# Patient Record
Sex: FEMALE | URBAN - METROPOLITAN AREA
[De-identification: names, ages, dates, MRNs, and addresses within clinical notes are randomized per-mention and may not be internally consistent; named-entity substitution may affect disease eponyms.]

---

## 2022-12-29 ENCOUNTER — PROBLEM (OUTPATIENT)
Dept: URBAN - METROPOLITAN AREA CLINIC 6 | Facility: CLINIC | Age: 57
End: 2022-12-29

## 2022-12-29 DIAGNOSIS — H43.812: ICD-10-CM

## 2022-12-29 DIAGNOSIS — H25.13: ICD-10-CM

## 2022-12-29 PROCEDURE — 92014 COMPRE OPH EXAM EST PT 1/>: CPT

## 2022-12-29 ASSESSMENT — KERATOMETRY
OD_AXISANGLE2_DEGREES: 118
OD_K2POWER_DIOPTERS: 43.50
OS_K1POWER_DIOPTERS: 42.75
OD_K1POWER_DIOPTERS: 43.00
OS_K2POWER_DIOPTERS: 43.50
OD_AXISANGLE_DEGREES: 28
OS_AXISANGLE2_DEGREES: 62
OS_AXISANGLE_DEGREES: 152

## 2022-12-29 ASSESSMENT — VISUAL ACUITY
OD_CC: 20/30-1
OS_CC: 20/30+2

## 2022-12-29 ASSESSMENT — TONOMETRY
OS_IOP_MMHG: 11
OD_IOP_MMHG: 10

## 2023-01-27 ENCOUNTER — FOLLOW UP (OUTPATIENT)
Dept: URBAN - METROPOLITAN AREA CLINIC 6 | Facility: CLINIC | Age: 58
End: 2023-01-27

## 2023-01-27 DIAGNOSIS — H25.13: ICD-10-CM

## 2023-01-27 DIAGNOSIS — H43.812: ICD-10-CM

## 2023-01-27 PROCEDURE — 92012 INTRM OPH EXAM EST PATIENT: CPT

## 2023-01-27 ASSESSMENT — KERATOMETRY
OS_K2POWER_DIOPTERS: 43.50
OD_K2POWER_DIOPTERS: 43.50
OD_K1POWER_DIOPTERS: 43.00
OS_AXISANGLE2_DEGREES: 62
OS_K1POWER_DIOPTERS: 42.75
OD_AXISANGLE2_DEGREES: 118
OD_AXISANGLE_DEGREES: 28
OS_AXISANGLE_DEGREES: 152

## 2023-01-27 ASSESSMENT — VISUAL ACUITY
OD_SC: 20/150
OS_SC: 20/200
OS_PH: 20/50
OD_PH: 20/40-3

## 2023-01-27 ASSESSMENT — TONOMETRY
OD_IOP_MMHG: 11
OS_IOP_MMHG: 9

## 2024-10-25 ENCOUNTER — OFFICE VISIT (OUTPATIENT)
Dept: URGENT CARE | Facility: CLINIC | Age: 59
End: 2024-10-25
Payer: COMMERCIAL

## 2024-10-25 ENCOUNTER — APPOINTMENT (OUTPATIENT)
Dept: RADIOLOGY | Facility: CLINIC | Age: 59
End: 2024-10-25
Payer: COMMERCIAL

## 2024-10-25 VITALS
OXYGEN SATURATION: 99 % | DIASTOLIC BLOOD PRESSURE: 61 MMHG | HEIGHT: 63 IN | SYSTOLIC BLOOD PRESSURE: 112 MMHG | BODY MASS INDEX: 23.92 KG/M2 | WEIGHT: 135 LBS | HEART RATE: 74 BPM | TEMPERATURE: 98.4 F | RESPIRATION RATE: 14 BRPM

## 2024-10-25 DIAGNOSIS — M79.602 PAIN OF LEFT UPPER EXTREMITY: ICD-10-CM

## 2024-10-25 DIAGNOSIS — S52.135A CLOSED NONDISPLACED FRACTURE OF NECK OF LEFT RADIUS, INITIAL ENCOUNTER: Primary | ICD-10-CM

## 2024-10-25 PROCEDURE — 73110 X-RAY EXAM OF WRIST: CPT

## 2024-10-25 PROCEDURE — 73080 X-RAY EXAM OF ELBOW: CPT

## 2024-10-25 PROCEDURE — 99213 OFFICE O/P EST LOW 20 MIN: CPT | Performed by: NURSE PRACTITIONER
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
## 2024-10-25 NOTE — PATIENT INSTRUCTIONS
Sling for comfort.  Do NOT sleep with sling on.  Tylenol or Motrin as needed for pain   Apply ice to the areas of discomfort   Rest  Follow up with your PCP if no improvement     Follow up with orthopedics        Patient Education     ??????????   ??????   ???????????????????????????????????????????????????????????????????????????????????????????????       ??????????   ???????????????????????????????????????????????????  ???????????????????????????????????????????????????????????????  ?????????????????????????????????????? 1 ? 2 ????????? 10 ? 15 ?????????? 24 ? 48 ??????  ?????????????????????????????????  ???????????   ??????????????????????????????????  ???????????   ?????????????  ?????????  ????????????   ???????????????????????????  ???????????????   ??????????????????  ?????????????  ???????  ??????????????  ?????????????   ????????????????  ??????????  ????????????  ???????????????????   ????????????????????????????????????????????????????????????????????????????????????????????????????  ??????????  ????????????????????  ????????????????????  ????????????   ??????????????/???????????????????????????????/???????????????????????????????????????????????????????????????????????????????????????????????????????????????????????????????????????????????????????????????????????????????????????????????????????????????[employer] ??????????????????????????????????????????????????????? [email or web address]   ??   ?? ? 2024 [employer] ???????/??????????

## 2024-10-31 ENCOUNTER — OFFICE VISIT (OUTPATIENT)
Dept: OBGYN CLINIC | Facility: CLINIC | Age: 59
End: 2024-10-31
Payer: COMMERCIAL

## 2024-10-31 VITALS
HEIGHT: 63 IN | WEIGHT: 135 LBS | SYSTOLIC BLOOD PRESSURE: 110 MMHG | HEART RATE: 75 BPM | BODY MASS INDEX: 23.92 KG/M2 | DIASTOLIC BLOOD PRESSURE: 76 MMHG

## 2024-10-31 DIAGNOSIS — S52.135A CLOSED NONDISPLACED FRACTURE OF NECK OF LEFT RADIUS, INITIAL ENCOUNTER: Primary | ICD-10-CM

## 2024-10-31 PROCEDURE — 24650 CLTX RDL HEAD/NCK FX WO MNPJ: CPT | Performed by: ORTHOPAEDIC SURGERY

## 2024-10-31 PROCEDURE — 99204 OFFICE O/P NEW MOD 45 MIN: CPT | Performed by: ORTHOPAEDIC SURGERY

## 2024-10-31 RX ORDER — IBUPROFEN 200 MG
200 TABLET ORAL EVERY 6 HOURS PRN
COMMUNITY
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
## 2024-11-06 ENCOUNTER — OFFICE VISIT (OUTPATIENT)
Dept: OBGYN CLINIC | Facility: CLINIC | Age: 59
End: 2024-11-06

## 2024-11-06 VITALS
HEIGHT: 63 IN | DIASTOLIC BLOOD PRESSURE: 86 MMHG | SYSTOLIC BLOOD PRESSURE: 124 MMHG | BODY MASS INDEX: 23.92 KG/M2 | WEIGHT: 135 LBS | HEART RATE: 88 BPM

## 2024-11-06 DIAGNOSIS — M25.532 LEFT WRIST PAIN: ICD-10-CM

## 2024-11-06 DIAGNOSIS — S52.135A CLOSED NONDISPLACED FRACTURE OF NECK OF LEFT RADIUS, INITIAL ENCOUNTER: Primary | ICD-10-CM

## 2024-11-06 PROCEDURE — 99024 POSTOP FOLLOW-UP VISIT: CPT | Performed by: ORTHOPAEDIC SURGERY

## 2024-11-06 RX ORDER — CELECOXIB 200 MG/1
200 CAPSULE ORAL DAILY
Qty: 30 CAPSULE | Refills: 1 | Status: SHIPPED | OUTPATIENT
Start: 2024-11-06 | End: 2024-11-07

## 2024-11-06 NOTE — PROGRESS NOTES
[facility] [address] [id number] [phone]       Hector Sapp  [id number]  1965    ORTHOPAEDIC SURGERY OUTPATIENT NOTE  11/6/2024      HISTORY:  59 y.o. female  left elbow nondisplaced radial neck fracture, DOI 10/25/2024.  Patient is present in the room today with her daughter who is translating for her.  Patient states she is being having worse pain since last office visit on 10/31/2024.  Patient is having pain in the left distal radius region radiating up to the elbow.  States she has pain with twisting.  Patient is taking ibuprofen for pain relief.    History reviewed. No pertinent past medical history.    Past Surgical History:   Procedure Laterality Date    CHOLECYSTECTOMY         Social History     Socioeconomic History    Marital status: /Civil Union     Spouse name: Not on file    Number of children: Not on file    Years of education: Not on file    Highest education level: Not on file   Occupational History    Not on file   Tobacco Use    Smoking status: Never    Smokeless tobacco: Never   Vaping Use    Vaping status: Never Used   Substance and Sexual Activity    Alcohol use: Not Currently    Drug use: Not Currently    Sexual activity: Not on file   Other Topics Concern    Not on file   Social History Narrative    Not on file     Social Determinants of Health     Financial Resource Strain: Not on file   Food Insecurity: Not on file   Transportation Needs: Not on file   Physical Activity: Not on file   Stress: Not on file   Social Connections: Not on file   Intimate Partner Violence: Not on file   Housing Stability: Not on file       History reviewed. No pertinent family history.     Patient's Medications   New Prescriptions    No medications on file   Previous Medications    IBUPROFEN (MOTRIN) 200 MG TABLET    Take 200 mg by mouth every 6 (six) hours as needed for mild pain PRN for pain   Modified  "Medications    No medications on file   Discontinued Medications    No medications on file       No Known Allergies     /86 (BP Location: Left arm, Patient Position: Sitting, Cuff Size: Standard)   Pulse 88   Ht 5' 3\" (1.6 m)   Wt 61.2 kg (135 lb)   BMI 23.91 kg/m²      REVIEW OF SYSTEMS:  Constitutional: Negative.    HEENT: Negative.    Respiratory: Negative.    Skin: Negative.    Neurological: Negative.    Psychiatric/Behavioral: Negative.  Musculoskeletal: Negative except for that mentioned in the HPI.    Gen: No acute distress, resting comfortably in bed  HEENT: Eyes clear, moist mucus membranes, hearing intact  Respiratory: No audible wheezing or stridor  Cardiovascular: Well Perfused peripherally, 2+ distal pulse  Abdomen: nondistended, no peritoneal signs     PHYSICAL EXAM:    LEFT ELBOW:     Appearance: Ecchymosis lateral aspect of the elbow     Flexion: 140 degrees  Extension: 0 degrees  Pronation: 80 degrees  Supination: 80 degrees     TTP Lateral Epicondyle: negative  TTP Medial Epicondyle: negative  TTP Olecranon: negative  TTP Radial Head: negative  TTP Biceps Tendon: negative     Strength:  Flexion: 5/5  Extension: 5/5  Pronation: 5/5  Supination: 5/5     No pain with elbow extension against resistance, no pain at the triceps insertion        Pain with resisted wrist extension: negative  Pain with resisted 3rd finger extension: negative  Pain with resisted wrist flexion: negative     Varus laxity: negative  Valgus laxity: negative  Milking maneuver: negative  Moving valgus stress test: negative     Cubital tunnel Tinel's: negative     Radial/median/ulnar nerve intact     <2 sec cap refill    IMAGING:  Not taken today     ASSESSMENT AND PLAN:  59 y.o. female  with left elbow nondisplaced radial neck fracture, DOI 10/25/2024.       Patient's pain is getting worse. Provided patient with counter force brace, protective sling  and wrist brace. he is only wear the brace  and sling only with " activities and for comfort. Therapy order was placed for the left elbow and wrist for range of motion exercises with the brace off. Prescribed patient Celebrex for pain relief. She  is to follow up PRN.      Scribe Attestation      I,:  Jerod Maya MA am acting as a scribe while in the presence of the attending physician.:       I,:  Scott Scott DO personally performed the services described in this documentation    as scribed in my presence.:

## 2024-11-07 DIAGNOSIS — S52.135A CLOSED NONDISPLACED FRACTURE OF NECK OF LEFT RADIUS, INITIAL ENCOUNTER: Primary | ICD-10-CM

## 2024-11-07 RX ORDER — CELECOXIB 100 MG/1
100 CAPSULE ORAL 2 TIMES DAILY
Qty: 28 CAPSULE | Refills: 0 | Status: SHIPPED | OUTPATIENT
Start: 2024-11-07 | End: 2024-11-21